# Patient Record
Sex: FEMALE | Race: BLACK OR AFRICAN AMERICAN | NOT HISPANIC OR LATINO | Employment: FULL TIME | URBAN - METROPOLITAN AREA
[De-identification: names, ages, dates, MRNs, and addresses within clinical notes are randomized per-mention and may not be internally consistent; named-entity substitution may affect disease eponyms.]

---

## 2023-05-25 ENCOUNTER — APPOINTMENT (OUTPATIENT)
Dept: WOMENS IMAGING | Facility: HOSPITAL | Age: 33
End: 2023-05-25
Payer: COMMERCIAL

## 2023-05-25 PROCEDURE — 77066 DX MAMMO INCL CAD BI: CPT | Performed by: RADIOLOGY

## 2023-05-25 PROCEDURE — 77062 BREAST TOMOSYNTHESIS BI: CPT | Performed by: RADIOLOGY

## 2023-05-25 PROCEDURE — G0279 TOMOSYNTHESIS, MAMMO: HCPCS | Performed by: RADIOLOGY

## 2023-05-25 PROCEDURE — 76642 ULTRASOUND BREAST LIMITED: CPT | Performed by: RADIOLOGY

## 2024-11-05 ENCOUNTER — OFFICE VISIT (OUTPATIENT)
Dept: FAMILY MEDICINE CLINIC | Facility: CLINIC | Age: 34
End: 2024-11-05
Payer: COMMERCIAL

## 2024-11-05 VITALS
TEMPERATURE: 98.3 F | RESPIRATION RATE: 16 BRPM | WEIGHT: 154.8 LBS | SYSTOLIC BLOOD PRESSURE: 128 MMHG | HEART RATE: 80 BPM | BODY MASS INDEX: 24.3 KG/M2 | HEIGHT: 67 IN | OXYGEN SATURATION: 96 % | DIASTOLIC BLOOD PRESSURE: 88 MMHG

## 2024-11-05 DIAGNOSIS — Z87.59 HISTORY OF POSTPARTUM HYPERTENSION: ICD-10-CM

## 2024-11-05 DIAGNOSIS — Z00.00 ANNUAL PHYSICAL EXAM: Primary | ICD-10-CM

## 2024-11-05 DIAGNOSIS — R03.0 ELEVATED BLOOD PRESSURE READING WITHOUT DIAGNOSIS OF HYPERTENSION: ICD-10-CM

## 2024-11-05 DIAGNOSIS — Z13.29 SCREENING FOR THYROID DISORDER: ICD-10-CM

## 2024-11-05 DIAGNOSIS — Z86.79 HISTORY OF POSTPARTUM HYPERTENSION: ICD-10-CM

## 2024-11-05 DIAGNOSIS — Z13.220 SCREENING FOR LIPID DISORDERS: ICD-10-CM

## 2024-11-05 DIAGNOSIS — N83.202 CYST OF LEFT OVARY: ICD-10-CM

## 2024-11-05 DIAGNOSIS — Z00.00 ENCOUNTER FOR PREVENTIVE CARE: ICD-10-CM

## 2024-11-05 DIAGNOSIS — D64.9 ANEMIA, UNSPECIFIED TYPE: ICD-10-CM

## 2024-11-05 PROCEDURE — 99385 PREV VISIT NEW AGE 18-39: CPT | Performed by: STUDENT IN AN ORGANIZED HEALTH CARE EDUCATION/TRAINING PROGRAM

## 2024-11-05 RX ORDER — PROGESTERONE 100 MG/1
CAPSULE ORAL
COMMUNITY
Start: 2024-10-08

## 2024-11-06 LAB
ALBUMIN SERPL-MCNC: 4.4 G/DL (ref 3.9–4.9)
ALP SERPL-CCNC: 51 IU/L (ref 44–121)
ALT SERPL-CCNC: 13 IU/L (ref 0–32)
APPEARANCE UR: CLEAR
AST SERPL-CCNC: 21 IU/L (ref 0–40)
BACTERIA #/AREA URNS HPF: NORMAL /[HPF]
BILIRUB SERPL-MCNC: 0.4 MG/DL (ref 0–1.2)
BILIRUB UR QL STRIP: NEGATIVE
BUN SERPL-MCNC: 16 MG/DL (ref 6–20)
BUN/CREAT SERPL: 15 (ref 9–23)
CALCIUM SERPL-MCNC: 9.8 MG/DL (ref 8.7–10.2)
CASTS URNS QL MICRO: NORMAL /LPF
CHLORIDE SERPL-SCNC: 105 MMOL/L (ref 96–106)
CHOLEST SERPL-MCNC: 172 MG/DL (ref 100–199)
CO2 SERPL-SCNC: 22 MMOL/L (ref 20–29)
COLOR UR: YELLOW
CREAT SERPL-MCNC: 1.04 MG/DL (ref 0.57–1)
EGFRCR SERPLBLD CKD-EPI 2021: 72 ML/MIN/1.73
EPI CELLS #/AREA URNS HPF: NORMAL /HPF (ref 0–10)
ERYTHROCYTE [DISTWIDTH] IN BLOOD BY AUTOMATED COUNT: 12.2 % (ref 11.7–15.4)
GLOBULIN SER CALC-MCNC: 2.6 G/DL (ref 1.5–4.5)
GLUCOSE SERPL-MCNC: 85 MG/DL (ref 70–99)
GLUCOSE UR QL STRIP: NEGATIVE
HCT VFR BLD AUTO: 38.8 % (ref 34–46.6)
HDLC SERPL-MCNC: 94 MG/DL
HGB BLD-MCNC: 12.9 G/DL (ref 11.1–15.9)
HGB UR QL STRIP: NEGATIVE
KETONES UR QL STRIP: NEGATIVE
LDLC SERPL CALC-MCNC: 69 MG/DL (ref 0–99)
LEUKOCYTE ESTERASE UR QL STRIP: NEGATIVE
MCH RBC QN AUTO: 31.8 PG (ref 26.6–33)
MCHC RBC AUTO-ENTMCNC: 33.2 G/DL (ref 31.5–35.7)
MCV RBC AUTO: 96 FL (ref 79–97)
MICRO URNS: NORMAL
MICRO URNS: NORMAL
NITRITE UR QL STRIP: NEGATIVE
PH UR STRIP: 6 [PH] (ref 5–7.5)
PLATELET # BLD AUTO: 321 X10E3/UL (ref 150–450)
POTASSIUM SERPL-SCNC: 4.8 MMOL/L (ref 3.5–5.2)
PROT SERPL-MCNC: 7 G/DL (ref 6–8.5)
PROT UR QL STRIP: NEGATIVE
RBC # BLD AUTO: 4.06 X10E6/UL (ref 3.77–5.28)
RBC #/AREA URNS HPF: NORMAL /HPF (ref 0–2)
SODIUM SERPL-SCNC: 140 MMOL/L (ref 134–144)
SP GR UR STRIP: 1.02 (ref 1–1.03)
TRIGL SERPL-MCNC: 41 MG/DL (ref 0–149)
TSH SERPL DL<=0.005 MIU/L-ACNC: 1.09 UIU/ML (ref 0.45–4.5)
UROBILINOGEN UR STRIP-MCNC: 0.2 MG/DL (ref 0.2–1)
VLDLC SERPL CALC-MCNC: 9 MG/DL (ref 5–40)
WBC # BLD AUTO: 4 X10E3/UL (ref 3.4–10.8)
WBC #/AREA URNS HPF: NORMAL /HPF (ref 0–5)

## 2024-11-13 ENCOUNTER — PATIENT MESSAGE (OUTPATIENT)
Dept: FAMILY MEDICINE CLINIC | Facility: CLINIC | Age: 34
End: 2024-11-13
Payer: COMMERCIAL

## 2024-11-14 NOTE — PROGRESS NOTES
"Chief Complaint  Establish Care and Hypertension    Subjective    History of Present Illness  The patient is a 34-year-old female presenting for the first time as a new patient.    She has a history of hypertension, which typically manifests postpartum rather than during pregnancy. She is not currently on any medication for this condition. Her blood pressure readings at home have been fluctuating, with diastolic readings sometimes in the 70s and 80s.    She experienced a miscarriage in 2024, initially diagnosed as a complete . However, her hCG levels began to rise again, leading to a diagnosis of an abnormal pregnancy at 5 weeks. She was prescribed misoprostol, which was ineffective, necessitating a D and C procedure in 2024. She is currently taking progesterone to aid in uterine lining development, as she is planning another pregnancy. She also takes Tylenol as needed.    She has a cyst on her left ovary, which is being monitored by her gynecologist. She has experienced significant hair loss after each pregnancy. She recently had a Pap smear and is taking prenatal multivitamins. Her menstrual cycles are regular, occurring every 28 to 30 days, but are lighter than before her pregnancy.    She reports no ear problems, abdominal pain, nausea, vomiting, urinary issues, leg swelling, open wounds, or rashes. She has had a recent breast examination.    She has a small fatty pocket, which she believes may be diastasis recti, a condition she had previously. The size of the pocket has remained unchanged for 4 years.    She is committed to maintaining a healthy lifestyle for her children and family.       Sophia Noble presents to [facility]  Hypertension        Objective   Vital Signs:  /88 (BP Location: Left arm, Patient Position: Sitting, Cuff Size: Adult)   Pulse 80   Temp 98.3 °F (36.8 °C) (Oral)   Resp 16   Ht 170.2 cm (67\")   Wt 70.2 kg (154 lb 12.8 oz)   SpO2 " "96%   BMI 24.25 kg/m²   Estimated body mass index is 24.25 kg/m² as calculated from the following:    Height as of this encounter: 170.2 cm (67\").    Weight as of this encounter: 70.2 kg (154 lb 12.8 oz).    BMI is within normal parameters. No other follow-up for BMI required.      Physical Exam  HENT:      Head: Normocephalic and atraumatic.      Mouth/Throat:      Mouth: Mucous membranes are moist.      Pharynx: Oropharynx is clear.   Eyes:      Extraocular Movements: Extraocular movements intact.      Conjunctiva/sclera: Conjunctivae normal.      Pupils: Pupils are equal, round, and reactive to light.   Cardiovascular:      Rate and Rhythm: Normal rate and regular rhythm.   Pulmonary:      Effort: Pulmonary effort is normal.      Breath sounds: Normal breath sounds.   Abdominal:      General: Bowel sounds are normal.      Palpations: Abdomen is soft.   Musculoskeletal:         General: Normal range of motion.      Cervical back: Neck supple.   Skin:     General: Skin is warm.      Capillary Refill: Capillary refill takes less than 2 seconds.   Neurological:      General: No focal deficit present.      Mental Status: She is alert and oriented to person, place, and time. Mental status is at baseline.   Psychiatric:         Mood and Affect: Mood normal.        Result Review :  The following data was reviewed by: Kaylee Randall MD on 11/05/2024:  CMP          11/5/2024    15:20   CMP   Glucose 85    BUN 16    Creatinine 1.04    Sodium 140    Potassium 4.8    Chloride 105    Calcium 9.8    Total Protein 7.0    Albumin 4.4    Globulin 2.6    Total Bilirubin 0.4    Alkaline Phosphatase 51    AST (SGOT) 21    ALT (SGPT) 13    BUN/Creatinine Ratio 15      CBC          4/26/2024    12:57 6/13/2024    10:46 11/5/2024    15:20   CBC   WBC 3.85     2.69     4.0    RBC 4.25     3.44     4.06    Hemoglobin 13.1     10.8     12.9    Hematocrit 39.1     31.8     38.8    MCV 92.0     92.4     96    MCH 30.8     31.4     31.8  "   MCHC 33.5     34.0     33.2    RDW 12.4     12.5     12.2    Platelets 296     265     321       Details          This result is from an external source.             Lipid Panel          11/5/2024    15:20   Lipid Panel   Total Cholesterol 172    Triglycerides 41    HDL Cholesterol 94    VLDL Cholesterol 9    LDL Cholesterol  69      TSH          11/5/2024    15:20   TSH   TSH 1.090                Assessment and Plan   Diagnoses and all orders for this visit:    1. Annual physical exam (Primary)  -     CBC No Differential  -     TSH Rfx On Abnormal To Free T4  -     Comprehensive metabolic panel  -     Urinalysis With Microscopic - Urine, Clean Catch  -     Lipid panel    2. Screening for lipid disorders  -     Lipid panel    3. Screening for thyroid disorder  -     TSH Rfx On Abnormal To Free T4  -     Comprehensive metabolic panel    4. Anemia, unspecified type  -     CBC No Differential    5. Cyst of left ovary    6. Elevated blood pressure reading without diagnosis of hypertension    7. History of postpartum hypertension    Other orders  -     Microscopic Examination -        Assessment & Plan  1. Hypertension.  Her blood pressure today is 128/88, which is slightly elevated. She has a history of postpartum hypertension but is currently not on any medication. She is advised to monitor her blood pressure at home. If her blood pressure readings consistently exceed 85 diastolic, medication may be considered.    2. Anemia.  She experienced anemia during her ER visit due to bleeding, with a hemoglobin level of 10.8. A CBC will be conducted to recheck her hemoglobin levels. If anemia persists, further evaluation and treatment will be considered.    3. Miscarriage.  She had a miscarriage in April, followed by a D&C in June after unsuccessful treatment with misoprostol. She is currently taking progesterone to help build up her uterine lining as she is trying to conceive again. If she gets a positive pregnancy test, she  should stop taking progesterone.    4. Ovarian cyst.  She has a cyst on her left ovary that is being monitored by her gynecologist. She has a follow-up ultrasound scheduled for November 7, 2024, to reassess the cyst.    5. Diastasis recti.  She has a history of diastasis recti, which has not changed in size over the past four years. She is advised to avoid heavy lifting and monitor for any changes. If the condition worsens, further evaluation may be necessary.    6. Health Maintenance.  She is due for influenza and COVID-19 vaccines, which she can receive at her local pharmacy. A comprehensive set of tests will be conducted, including CBC, thyroid function, CMP, urinalysis, and lipid panel. She recently had a Pap smear two weeks ago.         Patient encouraged to partake of healthy diet rich in fresh fruits and vegetables as well as lean proteins.  Patient encouraged to participate in daily exercise with goal of 30 min sustained activity.  Wear seatbelt when driving  Flu shot annually         Follow Up   No follow-ups on file.  Patient was given instructions and counseling regarding her condition or for health maintenance advice. Please see specific information pulled into the AVS if appropriate.     Patient or patient representative verbalized consent for the use of Ambient Listening during the visit with  Kaylee Randall MD for chart documentation. 11/14/2024  13:34 EST

## 2025-02-25 ENCOUNTER — OFFICE VISIT (OUTPATIENT)
Dept: OBSTETRICS AND GYNECOLOGY | Age: 35
End: 2025-02-25
Payer: COMMERCIAL

## 2025-02-25 VITALS
WEIGHT: 152 LBS | SYSTOLIC BLOOD PRESSURE: 102 MMHG | BODY MASS INDEX: 23.04 KG/M2 | HEIGHT: 68 IN | DIASTOLIC BLOOD PRESSURE: 70 MMHG

## 2025-02-25 DIAGNOSIS — R10.2 PELVIC PAIN: Primary | ICD-10-CM

## 2025-02-25 LAB
BILIRUB BLD-MCNC: NEGATIVE MG/DL
CLARITY, POC: CLEAR
COLOR UR: YELLOW
GLUCOSE UR STRIP-MCNC: NEGATIVE MG/DL
KETONES UR QL: NEGATIVE
LEUKOCYTE EST, POC: NEGATIVE
NITRITE UR-MCNC: NEGATIVE MG/ML
PH UR: 6 [PH] (ref 5–8)
PROT UR STRIP-MCNC: NEGATIVE MG/DL
RBC # UR STRIP: NEGATIVE /UL
SP GR UR: 1.02 (ref 1–1.03)
UROBILINOGEN UR QL: NORMAL

## 2025-02-25 NOTE — PROGRESS NOTES
"Subjective     Chief Complaint   Patient presents with    Pelvic Pain     New gyn, annual was 10/2024  CC:  pelvic pain after miscarriage and D&C and ovarian cyst, US today       Sophia Noble is a 34 y.o.  whose LMP is Patient's last menstrual period was 2025.     New GYN  States she had her annual exam in October   Pap was normal  Was seeing Dr Galvez  Had miscarriage this past April and subsequent D&C in  due to failed medication management  She was having pretty constant pelvic pain throughout the month after her D&C  Was being monitored for ovarian cysts   Her periods were very very light after the D&C and she was concerned she had scarring  She isnt actively trying for pregnancy but fine if it happened  Since making her appt the pain has mostly subsided but periods are still very light  No pain with IC, normal bowel movements and no urinary issues  She is , works for [employer], has two boys ages 5,2      No Additional Complaints Reported    The following portions of the patient's history were reviewed and updated as appropriate:vital signs, allergies, current medications, past medical history, past social history, past surgical history, and problem list      Review of Systems   Pertinent items are noted in HPI.     Objective      /70   Ht 172.7 cm (68\")   Wt 68.9 kg (152 lb)   LMP 2025   Breastfeeding No   BMI 23.11 kg/m²     Physical Exam    General:   alert and no distress   Heart: Not performed today   Lungs: Not performed today.   Breast: Not performed today   Neck: na   Abdomen: {Not performed today   CVA: Not performed today   Pelvis: Deferred   Extremities: Not performed today   Neurologic: negative   Psychiatric: Normal affect, judgement, and mood       Lab Review   Labs: UA     Imaging   Ultrasound - Pelvic Vaginal    Assessment & Plan     ASSESSMENT  1. Pelvic pain        PLAN  1.   Orders Placed This Encounter   Procedures    POC Urinalysis Dipstick       2. " Medications prescribed this encounter:      No orders of the defined types were placed in this encounter.      3. Resolved ovarian cyst. Pt is feeling better and will monitor cycles/pain. She will call if she has any issues and f/u for annual exam.    Follow up: annual and prn    IFEOMA Ortiz  2/25/2025